# Patient Record
Sex: FEMALE | NOT HISPANIC OR LATINO | URBAN - METROPOLITAN AREA
[De-identification: names, ages, dates, MRNs, and addresses within clinical notes are randomized per-mention and may not be internally consistent; named-entity substitution may affect disease eponyms.]

---

## 2017-02-27 ENCOUNTER — EMERGENCY (EMERGENCY)
Facility: HOSPITAL | Age: 62
LOS: 1 days | Discharge: HOME | End: 2017-02-27
Attending: EMERGENCY MEDICINE
Payer: COMMERCIAL

## 2017-02-27 VITALS
DIASTOLIC BLOOD PRESSURE: 99 MMHG | SYSTOLIC BLOOD PRESSURE: 155 MMHG | OXYGEN SATURATION: 98 % | RESPIRATION RATE: 20 BRPM | WEIGHT: 151.9 LBS | HEART RATE: 69 BPM | HEIGHT: 67 IN | TEMPERATURE: 98 F

## 2017-02-27 DIAGNOSIS — M54.5 LOW BACK PAIN: ICD-10-CM

## 2017-02-27 DIAGNOSIS — Z88.8 ALLERGY STATUS TO OTHER DRUGS, MEDICAMENTS AND BIOLOGICAL SUBSTANCES STATUS: ICD-10-CM

## 2017-02-27 DIAGNOSIS — M54.30 SCIATICA, UNSPECIFIED SIDE: ICD-10-CM

## 2017-02-27 DIAGNOSIS — M50.20 OTHER CERVICAL DISC DISPLACEMENT, UNSPECIFIED CERVICAL REGION: ICD-10-CM

## 2017-02-27 PROCEDURE — 99284 EMERGENCY DEPT VISIT MOD MDM: CPT

## 2017-02-27 RX ORDER — METHOCARBAMOL 500 MG/1
750 TABLET, FILM COATED ORAL ONCE
Qty: 0 | Refills: 0 | Status: COMPLETED | OUTPATIENT
Start: 2017-02-27 | End: 2017-02-27

## 2017-02-27 RX ORDER — KETOROLAC TROMETHAMINE 30 MG/ML
30 SYRINGE (ML) INJECTION ONCE
Qty: 0 | Refills: 0 | Status: DISCONTINUED | OUTPATIENT
Start: 2017-02-27 | End: 2017-02-27

## 2017-02-27 RX ORDER — METHOCARBAMOL 500 MG/1
1 TABLET, FILM COATED ORAL
Qty: 10 | Refills: 0 | OUTPATIENT
Start: 2017-02-27 | End: 2017-03-04

## 2017-02-27 RX ADMIN — Medication 30 MILLIGRAM(S): at 20:36

## 2017-02-27 RX ADMIN — METHOCARBAMOL 750 MILLIGRAM(S): 500 TABLET, FILM COATED ORAL at 20:36

## 2017-02-27 NOTE — ED PROVIDER NOTE - MEDICAL DECISION MAKING DETAILS
Here with low back pain, sciatica  no  red flag signs or symptoms plan for rest, nsaids, muscle relaxant, outpt f/u

## 2017-02-27 NOTE — ED PROVIDER NOTE - ATTENDING CONTRIBUTION TO CARE
H&P by me: 61 year old female PMHx herniated discs c/o atraumatic low back pain x one week, radiating to legs, like prior sciatica episodes, no neurovascular complaints. PE: NAD, + bilateral paravertebral mid to distal lumbar mild tenderness, neurovascularly intact. I&P: sciatica, analgesics, rest, PMD follow up

## 2017-02-27 NOTE — ED PROVIDER NOTE - OBJECTIVE STATEMENT
61F with a history of chronic back pain, disc herniation, here with acute on chronic low back pain radiating down left lower extremity. No numbness or weakness. No bowel or bladder dysfunction. No fever, chills, nausea, vomiting. No trauma or injury. Able to ambulate without difficulty, has been taking Aleve with minimal relief.

## 2017-02-27 NOTE — ED PROVIDER NOTE - MUSCULOSKELETAL, MLM
Spine appears normal, range of motion is not limited, no muscle or joint tenderness Spine appears normal, range of motion is not limited, + bilateral paravertebral mid to distal lumbar mild tenderness

## 2017-11-02 ENCOUNTER — EMERGENCY (EMERGENCY)
Facility: HOSPITAL | Age: 62
LOS: 0 days | Discharge: HOME | End: 2017-11-02
Attending: EMERGENCY MEDICINE
Payer: COMMERCIAL

## 2017-11-02 VITALS
RESPIRATION RATE: 18 BRPM | TEMPERATURE: 98 F | DIASTOLIC BLOOD PRESSURE: 86 MMHG | WEIGHT: 154.98 LBS | HEART RATE: 72 BPM | OXYGEN SATURATION: 99 % | SYSTOLIC BLOOD PRESSURE: 129 MMHG | HEIGHT: 67 IN

## 2017-11-02 DIAGNOSIS — Z98.890 OTHER SPECIFIED POSTPROCEDURAL STATES: Chronic | ICD-10-CM

## 2017-11-02 PROCEDURE — 99284 EMERGENCY DEPT VISIT MOD MDM: CPT

## 2017-11-02 PROCEDURE — 73502 X-RAY EXAM HIP UNI 2-3 VIEWS: CPT | Mod: 26,RT

## 2017-11-02 RX ORDER — TRAMADOL HYDROCHLORIDE 50 MG/1
1 TABLET ORAL
Qty: 12 | Refills: 0 | OUTPATIENT
Start: 2017-11-02 | End: 2017-11-06

## 2017-11-02 RX ORDER — TRAMADOL HYDROCHLORIDE 50 MG/1
50 TABLET ORAL ONCE
Qty: 0 | Refills: 0 | Status: DISCONTINUED | OUTPATIENT
Start: 2017-11-02 | End: 2017-11-02

## 2017-11-02 RX ADMIN — TRAMADOL HYDROCHLORIDE 50 MILLIGRAM(S): 50 TABLET ORAL at 20:27

## 2017-11-02 NOTE — ED PROVIDER NOTE - OBJECTIVE STATEMENT
62F here with right hip pain, ongoing x 6 months, worsened over the last 2 months, has tried naproxen, orthopedics recommended injections but she has declined. Today with continued pain, no fever, chills, nausea, vomiting. No numbness or weakness. Ambulating without difficulty. No new trauma or injury. No abdominal pain.

## 2017-11-02 NOTE — ED PROVIDER NOTE - MEDICAL DECISION MAKING DETAILS
Here with acute on chronic hip pain, known arthritis, will repeat xray, add tramadol to naproxen, recommend orthopedic follow-up.

## 2017-11-02 NOTE — ED ADULT TRIAGE NOTE - CHIEF COMPLAINT QUOTE
c/o r hip area pain radiating down r leg intermittantly x 2 months denies known injury or recent trauma using aleve and naproxen prn with some improvement ambulatory without difficulty noted

## 2017-11-02 NOTE — ED PROVIDER NOTE - ATTENDING CONTRIBUTION TO CARE
62 year old female c/o atraumatic intermittent hip pain x several months; patient evaluated by orthopedist for similar complaint however patient states to decline recommended tx PE: NAD, abd NTND, lumbar spine nontender, right hip FROM, non-tender, gait normal. I&P: X-ray show degenerative changes, exacerbation of arthritis of hip, analgesics, rest, ortho follow up

## 2017-11-03 DIAGNOSIS — M25.551 PAIN IN RIGHT HIP: ICD-10-CM

## 2017-11-03 DIAGNOSIS — G89.29 OTHER CHRONIC PAIN: ICD-10-CM

## 2017-11-03 DIAGNOSIS — M79.661 PAIN IN RIGHT LOWER LEG: ICD-10-CM

## 2017-11-03 DIAGNOSIS — M13.851 OTHER SPECIFIED ARTHRITIS, RIGHT HIP: ICD-10-CM

## 2019-03-23 ENCOUNTER — EMERGENCY (EMERGENCY)
Facility: HOSPITAL | Age: 64
LOS: 0 days | Discharge: HOME | End: 2019-03-23
Attending: EMERGENCY MEDICINE
Payer: COMMERCIAL

## 2019-03-23 VITALS
SYSTOLIC BLOOD PRESSURE: 132 MMHG | TEMPERATURE: 98 F | RESPIRATION RATE: 16 BRPM | DIASTOLIC BLOOD PRESSURE: 78 MMHG | HEART RATE: 80 BPM | OXYGEN SATURATION: 99 %

## 2019-03-23 VITALS
HEART RATE: 83 BPM | OXYGEN SATURATION: 99 % | RESPIRATION RATE: 16 BRPM | SYSTOLIC BLOOD PRESSURE: 142 MMHG | WEIGHT: 158.07 LBS | HEIGHT: 67 IN | DIASTOLIC BLOOD PRESSURE: 82 MMHG | TEMPERATURE: 98 F

## 2019-03-23 DIAGNOSIS — J02.9 ACUTE PHARYNGITIS, UNSPECIFIED: ICD-10-CM

## 2019-03-23 DIAGNOSIS — Z96.641 PRESENCE OF RIGHT ARTIFICIAL HIP JOINT: Chronic | ICD-10-CM

## 2019-03-23 DIAGNOSIS — R05 COUGH: ICD-10-CM

## 2019-03-23 DIAGNOSIS — J06.9 ACUTE UPPER RESPIRATORY INFECTION, UNSPECIFIED: ICD-10-CM

## 2019-03-23 DIAGNOSIS — I10 ESSENTIAL (PRIMARY) HYPERTENSION: ICD-10-CM

## 2019-03-23 DIAGNOSIS — Z98.890 OTHER SPECIFIED POSTPROCEDURAL STATES: Chronic | ICD-10-CM

## 2019-03-23 PROCEDURE — 71046 X-RAY EXAM CHEST 2 VIEWS: CPT | Mod: 26

## 2019-03-23 PROCEDURE — 99283 EMERGENCY DEPT VISIT LOW MDM: CPT

## 2019-03-23 RX ORDER — BENZOCAINE AND MENTHOL 5; 1 G/100ML; G/100ML
1 LIQUID ORAL ONCE
Qty: 0 | Refills: 0 | Status: COMPLETED | OUTPATIENT
Start: 2019-03-23 | End: 2019-03-23

## 2019-03-23 RX ORDER — BENZOCAINE AND MENTHOL 5; 1 G/100ML; G/100ML
1 LIQUID ORAL
Qty: 24 | Refills: 0
Start: 2019-03-23 | End: 2019-03-24

## 2019-03-23 RX ADMIN — Medication 100 MILLIGRAM(S): at 09:59

## 2019-03-23 RX ADMIN — BENZOCAINE AND MENTHOL 1 LOZENGE: 5; 1 LIQUID ORAL at 09:59

## 2019-03-23 NOTE — ED PROVIDER NOTE - OBJECTIVE STATEMENT
Pt is a 62 yo lady with a pmhx of HTN who presents to the ED with sore throat and cough for 2 days. Has had no fever, but cough productive of yellow sputum. No chest pain, no sob, no body aches. Did not get flu vaccine. No sick contacts.

## 2019-03-23 NOTE — ED ADULT TRIAGE NOTE - CHIEF COMPLAINT QUOTE
Losing her voice since Thursday on and off. It is the worse now.  coughing since yesterday yellowish phlegm

## 2019-03-23 NOTE — ED ADULT NURSE NOTE - NSIMPLEMENTINTERV_GEN_ALL_ED
Implemented All Universal Safety Interventions:  Amanda to call system. Call bell, personal items and telephone within reach. Instruct patient to call for assistance. Room bathroom lighting operational. Non-slip footwear when patient is off stretcher. Physically safe environment: no spills, clutter or unnecessary equipment. Stretcher in lowest position, wheels locked, appropriate side rails in place.

## 2020-06-22 ENCOUNTER — OUTPATIENT (OUTPATIENT)
Dept: OUTPATIENT SERVICES | Facility: HOSPITAL | Age: 65
LOS: 1 days | Discharge: HOME | End: 2020-06-22

## 2020-06-22 DIAGNOSIS — Z98.890 OTHER SPECIFIED POSTPROCEDURAL STATES: Chronic | ICD-10-CM

## 2020-06-22 DIAGNOSIS — Z01.818 ENCOUNTER FOR OTHER PREPROCEDURAL EXAMINATION: ICD-10-CM

## 2020-06-23 LAB — SARS-COV-2 RNA SPEC QL NAA+PROBE: SIGNIFICANT CHANGE UP

## 2020-06-24 ENCOUNTER — TRANSCRIPTION ENCOUNTER (OUTPATIENT)
Age: 65
End: 2020-06-24

## 2020-06-25 ENCOUNTER — OUTPATIENT (OUTPATIENT)
Dept: OUTPATIENT SERVICES | Facility: HOSPITAL | Age: 65
LOS: 1 days | Discharge: HOME | End: 2020-06-25
Payer: COMMERCIAL

## 2020-06-25 ENCOUNTER — RESULT REVIEW (OUTPATIENT)
Age: 65
End: 2020-06-25

## 2020-06-25 VITALS
OXYGEN SATURATION: 100 % | HEART RATE: 62 BPM | DIASTOLIC BLOOD PRESSURE: 87 MMHG | RESPIRATION RATE: 17 BRPM | TEMPERATURE: 98 F | SYSTOLIC BLOOD PRESSURE: 157 MMHG

## 2020-06-25 VITALS
DIASTOLIC BLOOD PRESSURE: 81 MMHG | HEIGHT: 67 IN | SYSTOLIC BLOOD PRESSURE: 146 MMHG | TEMPERATURE: 98 F | OXYGEN SATURATION: 100 % | WEIGHT: 160.06 LBS | HEART RATE: 59 BPM | RESPIRATION RATE: 18 BRPM

## 2020-06-25 DIAGNOSIS — Z96.641 PRESENCE OF RIGHT ARTIFICIAL HIP JOINT: Chronic | ICD-10-CM

## 2020-06-25 DIAGNOSIS — C16.9 MALIGNANT NEOPLASM OF STOMACH, UNSPECIFIED: ICD-10-CM

## 2020-06-25 DIAGNOSIS — Z98.890 OTHER SPECIFIED POSTPROCEDURAL STATES: Chronic | ICD-10-CM

## 2020-06-25 PROCEDURE — 88312 SPECIAL STAINS GROUP 1: CPT | Mod: 26

## 2020-06-25 PROCEDURE — 88305 TISSUE EXAM BY PATHOLOGIST: CPT | Mod: 26

## 2020-06-25 RX ORDER — SODIUM CHLORIDE 9 MG/ML
1000 INJECTION, SOLUTION INTRAVENOUS
Refills: 0 | Status: DISCONTINUED | OUTPATIENT
Start: 2020-06-25 | End: 2020-07-10

## 2020-06-25 RX ORDER — AMLODIPINE BESYLATE 2.5 MG/1
1 TABLET ORAL
Qty: 0 | Refills: 0 | DISCHARGE

## 2020-06-25 RX ORDER — ASPIRIN/CALCIUM CARB/MAGNESIUM 324 MG
1 TABLET ORAL
Qty: 0 | Refills: 0 | DISCHARGE

## 2020-06-25 RX ORDER — ONDANSETRON 8 MG/1
4 TABLET, FILM COATED ORAL ONCE
Refills: 0 | Status: DISCONTINUED | OUTPATIENT
Start: 2020-06-25 | End: 2020-07-10

## 2020-06-25 NOTE — ASU PATIENT PROFILE, ADULT - PMH
Herniated disc, cervical    Rib fracture  two fractured ribs  after a fall, left side  Sciatic leg pain

## 2020-06-25 NOTE — ASU DISCHARGE PLAN (ADULT/PEDIATRIC) - ASU DC SPECIAL INSTRUCTIONSFT
No aspirin or aspirin like medications until further notice days (e.g.  Motrin, Advil, Naprosyn, Aleve, etc.) Tylenol is fine.   Otherwise, resume all previous medications

## 2020-06-25 NOTE — PROGRESS NOTE ADULT - SUBJECTIVE AND OBJECTIVE BOX
Procedure:           Upper GI endoscopy 06-25-20 @ 12:05    Indications:                     Monitored Anesthesia Care Provided by :     ____________________________________________________________________________________________________  Procedure:           Pre-Anesthesia Assessment:                       - Prior to the procedure, a History and Physical was performed, and patient                        medications and allergies were reviewed. The patient is competent. The risks                        and benefits of the procedure and the sedation options and risks were                        discussed with the patient. All questions were answered and informed consent                        was obtained. Patient identification and proposed procedure were verified by                        the physician, the nurse and the anesthesiologist in the procedure room.                        Mental Status Examination:    alert and oriented. Airway Examination: normal                        oropharyngeal airway and neck mobility. Respiratory Examination: clear to                        auscultation. CV Examination: normal. Prophylactic Antibiotics:   The patient                        does not require prophylactic antibiotics.                        Grade Assessment:  After                        reviewing the risks and benefits, the patient was deemed in satisfactory                        condition to undergo the procedure. The anesthesia plan was to use monitored                        anesthesia care (MAC). Immediately prior to administration of medications,                        the patient was re-assessed for adequacy to receive sedatives. The heart        		     rate, respiratory rate, oxygen saturations, blood pressure, adequacy of                        pulmonary ventilation, and response to care were monitored throughout the                        procedure. The physical status of the patient was re-assessed after the                        procedure.                       After obtaining informed consent, the endoscope was passed under direct                        vision. Throughout the procedure, the patient's blood pressure, pulse, and                        oxygen saturations were monitored continuously. The Endoscope was introduced                        through the mouth, and advanced to the second part of duodenum. retroflexion was done in the stomach The upper GI                        endoscopy was accomplished with ease. The patient tolerated the procedure                        well.    ESOPHAGUS:   WNL    STOMACH:  Erythema / Edema in antrum with a dimple in the center    DUODENUM: WNL      Assessment :   as above     PLAN :  check histology, stop the aspirin

## 2020-06-26 LAB — SURGICAL PATHOLOGY STUDY: SIGNIFICANT CHANGE UP

## 2020-07-01 DIAGNOSIS — R93.3 ABNORMAL FINDINGS ON DIAGNOSTIC IMAGING OF OTHER PARTS OF DIGESTIVE TRACT: ICD-10-CM

## 2020-07-01 DIAGNOSIS — I10 ESSENTIAL (PRIMARY) HYPERTENSION: ICD-10-CM

## 2020-07-01 DIAGNOSIS — Z79.82 LONG TERM (CURRENT) USE OF ASPIRIN: ICD-10-CM

## 2020-07-01 DIAGNOSIS — K29.70 GASTRITIS, UNSPECIFIED, WITHOUT BLEEDING: ICD-10-CM

## 2021-02-17 PROBLEM — S22.39XA FRACTURE OF ONE RIB, UNSPECIFIED SIDE, INITIAL ENCOUNTER FOR CLOSED FRACTURE: Chronic | Status: ACTIVE | Noted: 2020-06-25

## 2021-02-22 ENCOUNTER — OUTPATIENT (OUTPATIENT)
Dept: OUTPATIENT SERVICES | Facility: HOSPITAL | Age: 66
LOS: 1 days | Discharge: HOME | End: 2021-02-22
Payer: COMMERCIAL

## 2021-02-22 DIAGNOSIS — Z96.641 PRESENCE OF RIGHT ARTIFICIAL HIP JOINT: Chronic | ICD-10-CM

## 2021-02-22 DIAGNOSIS — Z98.890 OTHER SPECIFIED POSTPROCEDURAL STATES: Chronic | ICD-10-CM

## 2021-02-22 DIAGNOSIS — C34.90 MALIGNANT NEOPLASM OF UNSPECIFIED PART OF UNSPECIFIED BRONCHUS OR LUNG: ICD-10-CM

## 2021-02-23 ENCOUNTER — RESULT REVIEW (OUTPATIENT)
Age: 66
End: 2021-02-23

## 2021-02-23 LAB — SURGICAL PATHOLOGY STUDY: SIGNIFICANT CHANGE UP

## 2021-02-23 PROCEDURE — 88321 CONSLTJ&REPRT SLD PREP ELSWR: CPT

## 2021-03-02 ENCOUNTER — APPOINTMENT (OUTPATIENT)
Dept: HEMATOLOGY ONCOLOGY | Facility: CLINIC | Age: 66
End: 2021-03-02
Payer: COMMERCIAL

## 2021-03-02 VITALS
OXYGEN SATURATION: 100 % | HEART RATE: 87 BPM | SYSTOLIC BLOOD PRESSURE: 132 MMHG | BODY MASS INDEX: 25.21 KG/M2 | TEMPERATURE: 98.2 F | RESPIRATION RATE: 16 BRPM | DIASTOLIC BLOOD PRESSURE: 87 MMHG | WEIGHT: 158.73 LBS | HEIGHT: 66.5 IN

## 2021-03-02 DIAGNOSIS — E04.1 NONTOXIC SINGLE THYROID NODULE: ICD-10-CM

## 2021-03-02 DIAGNOSIS — R59.0 LOCALIZED ENLARGED LYMPH NODES: ICD-10-CM

## 2021-03-02 PROCEDURE — 99205 OFFICE O/P NEW HI 60 MIN: CPT

## 2021-03-02 PROCEDURE — 99072 ADDL SUPL MATRL&STAF TM PHE: CPT

## 2021-03-02 NOTE — REVIEW OF SYSTEMS
[Negative] : Allergic/Immunologic [Recent Change In Weight] : ~T no recent weight change [FreeTextEntry6] : as above

## 2021-03-02 NOTE — CONSULT LETTER
[Dear  ___] : Dear  [unfilled], [Consult Letter:] : I had the pleasure of evaluating your patient, [unfilled]. [Please see my note below.] : Please see my note below. [Consult Closing:] : Thank you very much for allowing me to participate in the care of this patient.  If you have any questions, please do not hesitate to contact me. [Sincerely,] : Sincerely, [FreeTextEntry2] : Dr. Aline Cortez [FreeTextEntry3] : Dion Espinal MD\par \par

## 2021-03-02 NOTE — ASSESSMENT
[FreeTextEntry1] : 66 yo never-smoker with recently diagnosed stage IB lung adeno\par Discussed early stage and no indication for adjuvant tx\par Even though stage IB, would recommend brain MRI for staging completion \par US neck to evaluate thyroid nodule and palpable neck adenopathy\par Obtain imaging discs and upload\par Will try to request NGS testing on tumor tissue- would not impact current recommendations of watchful waiting but  might help in future and to understand disease process\par Discussed her insomnia and stress. Provided comfort and reassurance\par SHe is uptodate with cancer screening\par Will follow up via TEB in 1 month \par Contact information provided and all questions were answered

## 2021-03-02 NOTE — HISTORY OF PRESENT ILLNESS
[Disease: _____________________] : Disease: [unfilled] [T: ___] : T[unfilled] [N: ___] : N[unfilled] [AJCC Stage: ____] : AJCC Stage: [unfilled] [de-identified] : Ms. Beckwith is a pleasant 65 year-old w with no prior medical history who fell and hurt her ribs on left side. CXR showed rib fx and an incidental left lower lobe lesion. This led to additional tests including CT C/A/P in September and a PET/CT on 10/9/20 which showed mildly FDG-avid 3.3 x 2.5 cm LLL opacity with no significant mediastinal or hilar adenopathy. Small FDG-avid notes were noted in rt neck as well as an non- FDG-avid rt thyroid nodule. Additional left sided nodules were noted, all sub-cm and not specific. FNA of this lesion was benign per patient. Follow up CT in November 2020 showed that the lesion had increased in size. She saw thoracic surgery and was recommended surgical resection. 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 She saw a medical oncologist at [facility] and was recommended no adjuvant chemo. She is here for another opinion. She has some soreness at left rib injury site and VATS site. \par Denies any cough, dyspnea, weight loss or other symptoms.  [de-identified] : mucinous adeno

## 2021-03-02 NOTE — PHYSICAL EXAM
[Fully active, able to carry on all pre-disease performance without restriction] : Status 0 - Fully active, able to carry on all pre-disease performance without restriction [Normal] : affect appropriate [de-identified] : rt thyroid nodule

## 2021-03-21 DIAGNOSIS — Z01.818 ENCOUNTER FOR OTHER PREPROCEDURAL EXAMINATION: ICD-10-CM

## 2021-03-22 ENCOUNTER — APPOINTMENT (OUTPATIENT)
Dept: DISASTER EMERGENCY | Facility: CLINIC | Age: 66
End: 2021-03-22

## 2021-03-23 LAB — SARS-COV-2 N GENE NPH QL NAA+PROBE: NOT DETECTED

## 2021-03-24 ENCOUNTER — TRANSCRIPTION ENCOUNTER (OUTPATIENT)
Age: 66
End: 2021-03-24

## 2021-03-25 ENCOUNTER — RESULT REVIEW (OUTPATIENT)
Age: 66
End: 2021-03-25

## 2021-03-25 ENCOUNTER — OUTPATIENT (OUTPATIENT)
Dept: OUTPATIENT SERVICES | Facility: HOSPITAL | Age: 66
LOS: 1 days | Discharge: HOME | End: 2021-03-25
Payer: COMMERCIAL

## 2021-03-25 VITALS
RESPIRATION RATE: 16 BRPM | SYSTOLIC BLOOD PRESSURE: 122 MMHG | TEMPERATURE: 98 F | HEART RATE: 57 BPM | OXYGEN SATURATION: 100 % | DIASTOLIC BLOOD PRESSURE: 74 MMHG

## 2021-03-25 VITALS
RESPIRATION RATE: 18 BRPM | SYSTOLIC BLOOD PRESSURE: 149 MMHG | HEART RATE: 75 BPM | WEIGHT: 158.07 LBS | OXYGEN SATURATION: 100 % | DIASTOLIC BLOOD PRESSURE: 78 MMHG | TEMPERATURE: 98 F | HEIGHT: 67 IN

## 2021-03-25 DIAGNOSIS — Z96.641 PRESENCE OF RIGHT ARTIFICIAL HIP JOINT: Chronic | ICD-10-CM

## 2021-03-25 DIAGNOSIS — Z98.890 OTHER SPECIFIED POSTPROCEDURAL STATES: Chronic | ICD-10-CM

## 2021-03-25 PROCEDURE — 88305 TISSUE EXAM BY PATHOLOGIST: CPT | Mod: 26

## 2021-03-25 RX ORDER — AMLODIPINE BESYLATE 2.5 MG/1
1 TABLET ORAL
Qty: 0 | Refills: 0 | DISCHARGE

## 2021-03-25 RX ORDER — SODIUM CHLORIDE 9 MG/ML
1000 INJECTION, SOLUTION INTRAVENOUS
Refills: 0 | Status: DISCONTINUED | OUTPATIENT
Start: 2021-03-25 | End: 2021-03-25

## 2021-03-25 RX ORDER — ASPIRIN/CALCIUM CARB/MAGNESIUM 324 MG
1 TABLET ORAL
Qty: 0 | Refills: 0 | DISCHARGE

## 2021-03-25 NOTE — ASU PATIENT PROFILE, ADULT - PMH
Herniated disc, cervical    Rib fracture  two fractured ribs  after a fall, left side  Sciatic leg pain     Herniated disc, cervical    HTN (hypertension)    Rib fracture  two fractured ribs  after a fall, left side  Sciatic leg pain

## 2021-03-25 NOTE — ASU DISCHARGE PLAN (ADULT/PEDIATRIC) - ASU DC SPECIAL INSTRUCTIONSFT
Resume all previous medications    Please call Dr. Stevenson's office at  to been seen in a follow up office visit two weeks after the completion of your procedure.

## 2021-03-25 NOTE — ASU DISCHARGE PLAN (ADULT/PEDIATRIC) - CARE PROVIDER_API CALL
[place] [address]  Phone: [phone]  Fax: [phone]  Follow Up Time: 2 weeks

## 2021-03-26 LAB — SURGICAL PATHOLOGY STUDY: SIGNIFICANT CHANGE UP

## 2021-04-03 DIAGNOSIS — K57.30 DIVERTICULOSIS OF LARGE INTESTINE WITHOUT PERFORATION OR ABSCESS WITHOUT BLEEDING: ICD-10-CM

## 2021-04-03 DIAGNOSIS — K63.89 OTHER SPECIFIED DISEASES OF INTESTINE: ICD-10-CM

## 2021-04-03 DIAGNOSIS — I10 ESSENTIAL (PRIMARY) HYPERTENSION: ICD-10-CM

## 2021-04-03 DIAGNOSIS — Z85.118 PERSONAL HISTORY OF OTHER MALIGNANT NEOPLASM OF BRONCHUS AND LUNG: ICD-10-CM

## 2021-04-03 DIAGNOSIS — Z79.82 LONG TERM (CURRENT) USE OF ASPIRIN: ICD-10-CM

## 2021-04-03 DIAGNOSIS — Z98.51 TUBAL LIGATION STATUS: ICD-10-CM

## 2021-04-03 DIAGNOSIS — Z12.11 ENCOUNTER FOR SCREENING FOR MALIGNANT NEOPLASM OF COLON: ICD-10-CM

## 2021-04-03 DIAGNOSIS — K21.9 GASTRO-ESOPHAGEAL REFLUX DISEASE WITHOUT ESOPHAGITIS: ICD-10-CM

## 2021-04-03 DIAGNOSIS — Z90.2 ACQUIRED ABSENCE OF LUNG [PART OF]: ICD-10-CM

## 2021-04-07 ENCOUNTER — OUTPATIENT (OUTPATIENT)
Dept: OUTPATIENT SERVICES | Facility: HOSPITAL | Age: 66
LOS: 1 days | Discharge: HOME | End: 2021-04-07

## 2021-04-07 DIAGNOSIS — Z96.641 PRESENCE OF RIGHT ARTIFICIAL HIP JOINT: Chronic | ICD-10-CM

## 2021-04-07 DIAGNOSIS — Z98.890 OTHER SPECIFIED POSTPROCEDURAL STATES: Chronic | ICD-10-CM

## 2021-04-07 DIAGNOSIS — C34.90 MALIGNANT NEOPLASM OF UNSPECIFIED PART OF UNSPECIFIED BRONCHUS OR LUNG: ICD-10-CM

## 2021-04-07 PROBLEM — I10 ESSENTIAL (PRIMARY) HYPERTENSION: Chronic | Status: ACTIVE | Noted: 2021-03-25

## 2021-04-09 ENCOUNTER — APPOINTMENT (OUTPATIENT)
Dept: HEMATOLOGY ONCOLOGY | Facility: CLINIC | Age: 66
End: 2021-04-09
Payer: COMMERCIAL

## 2021-04-09 DIAGNOSIS — C34.90 MALIGNANT NEOPLASM OF UNSPECIFIED PART OF UNSPECIFIED BRONCHUS OR LUNG: ICD-10-CM

## 2021-04-09 PROCEDURE — 99443: CPT

## 2021-04-09 NOTE — ASSESSMENT
[FreeTextEntry1] : 66 yo never-smoker with recently diagnosed stage IB lung adeno\par Discussed early stage and no indication for adjuvant tx\par Brain MRI showed no mets\par NGS revealed ERBB2 mut- I discussed this with pt. Would not  at this time but useful info in case there is recurrent disease. \par Thyroid US showed cystic nodules and a 1 year follow up was recommended. \par Pt will follow up with thoracic surgery and PCP\par OV/TEB with us in 1 year\par All questions were answered\par Encourage pt to take COVID vaccine

## 2021-04-09 NOTE — HISTORY OF PRESENT ILLNESS
[Disease: _____________________] : Disease: [unfilled] [T: ___] : T[unfilled] [N: ___] : N[unfilled] [AJCC Stage: ____] : AJCC Stage: [unfilled] [de-identified] : Ms. Beckwith is a pleasant 65 year-old w with no prior medical history who fell and hurt her ribs on left side. CXR showed rib fx and an incidental left lower lobe lesion. This led to additional tests including CT C/A/P in September and a PET/CT on 10/9/20 which showed mildly FDG-avid 3.3 x 2.5 cm LLL opacity with no significant mediastinal or hilar adenopathy. Small FDG-avid notes were noted in rt neck as well as an non- FDG-avid rt thyroid nodule. Additional left sided nodules were noted, all sub-cm and not specific. FNA of this lesion was benign per patient. Follow up CT in November 2020 showed that the lesion had increased in size. She saw thoracic surgery and was recommended surgical resection. 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 She saw a medical oncologist at [facility] and was recommended no adjuvant chemo. She is here for another opinion. She has some soreness at left rib injury site and VATS site. \par Denies any cough, dyspnea, weight loss or other symptoms. \par \par 4/9/21: No new complaints [de-identified] : mucinous adeno

## 2021-04-09 NOTE — REVIEW OF SYSTEMS
[Recent Change In Weight] : ~T no recent weight change [Negative] : Allergic/Immunologic [FreeTextEntry6] : as above

## 2021-10-18 NOTE — ED PROVIDER NOTE - SKIN, MLM
Caller: Brittani Lambert    Relationship: Self    Medication requested (name and dosage):   oxyCODONE-acetaminophen (Percocet) 7.5-325 MG per tablet    Pharmacy where request should be sent: [facility]    Additional details provided by patient: PATIENT OUT OF MEDICATION    Best call back number: [phone]    Does the patient have less than a 3 day supply:  [x] Yes  [] No    Janice Cueto   10/18/21 08:44 EDT       
Due for 6 month follow up, please schedule.   
Pt is aware  
Rx Refill Note  Requested Prescriptions     Pending Prescriptions Disp Refills   • oxyCODONE-acetaminophen (Percocet) 7.5-325 MG per tablet 60 tablet 0     Sig: Take 1 tablet by mouth Every 6 (Six) Hours As Needed for Moderate Pain .      Last office visit with prescribing clinician: 4/28/2021      Next office visit with prescribing clinician: Visit date not found            Loan Issa MA  10/18/21, 09:28 EDT  
Skin normal color for race, warm, dry and intact. No evidence of rash.

## 2022-10-07 NOTE — ED ADULT NURSE NOTE - NS ED NURSE DC PT EDUCATION RESOURCES
Anesthesia Evaluation     Patient summary reviewed and Nursing notes reviewed   history of anesthetic complications: PONV  NPO Solid Status: > 8 hours  NPO Liquid Status: > 8 hours           Airway   Mallampati: II  TM distance: >3 FB  Neck ROM: full  No difficulty expected  Dental - normal exam     Pulmonary    (+) sleep apnea (machine on recall),   (-) rhonchi, decreased breath sounds, wheezes, not a smoker  Cardiovascular   Exercise tolerance: good (4-7 METS)    PT is on anticoagulation therapy  Rhythm: regular  Rate: normal    (+) hypertension, CAD, cardiac stents (pt states had heart stent 6 months ago off effient x 7 days now ) Drug eluting stent within the past 12 months   (-) angina, ESTRADA, murmur      Neuro/Psych  (+) dizziness/light headedness,    (-) CVA  GI/Hepatic/Renal/Endo    (+)  GERD,  diabetes mellitus type 2 well controlled,   (-) no renal disease    Musculoskeletal     Abdominal     Abdomen: soft.   Substance History      OB/GYN          Other   arthritis,                    Anesthesia Plan    ASA 3     general with block     (Left adductor canal   Subtherapeutic propofol infusion for PONV prevention )  intravenous induction     Anesthetic plan, risks, benefits, and alternatives have been provided, discussed and informed consent has been obtained with: patient.        CODE STATUS:       
Yes

## 2023-05-31 ENCOUNTER — APPOINTMENT (OUTPATIENT)
Dept: ORTHOPEDIC SURGERY | Facility: CLINIC | Age: 68
End: 2023-05-31
Payer: MEDICARE

## 2023-05-31 VITALS — BODY MASS INDEX: 25.09 KG/M2 | WEIGHT: 158 LBS | HEIGHT: 66.5 IN

## 2023-05-31 DIAGNOSIS — M54.12 RADICULOPATHY, CERVICAL REGION: ICD-10-CM

## 2023-05-31 DIAGNOSIS — M43.16 SPONDYLOLISTHESIS, LUMBAR REGION: ICD-10-CM

## 2023-05-31 DIAGNOSIS — M54.50 LOW BACK PAIN, UNSPECIFIED: ICD-10-CM

## 2023-05-31 DIAGNOSIS — M50.30 OTHER CERVICAL DISC DEGENERATION, UNSPECIFIED CERVICAL REGION: ICD-10-CM

## 2023-05-31 DIAGNOSIS — M54.2 CERVICALGIA: ICD-10-CM

## 2023-05-31 PROCEDURE — 99205 OFFICE O/P NEW HI 60 MIN: CPT

## 2023-05-31 PROCEDURE — 72050 X-RAY EXAM NECK SPINE 4/5VWS: CPT

## 2023-05-31 PROCEDURE — 72110 X-RAY EXAM L-2 SPINE 4/>VWS: CPT

## 2023-05-31 RX ORDER — CELECOXIB 100 MG/1
100 CAPSULE ORAL TWICE DAILY
Qty: 30 | Refills: 0 | Status: ACTIVE | COMMUNITY
Start: 2023-05-31 | End: 1900-01-01

## 2023-05-31 NOTE — HISTORY OF PRESENT ILLNESS
[Neck] : neck [Lower back] : lower back [10] : 10 [Dull/Aching] : dull/aching [Tingling] : tingling [Sleep] : sleep [Nothing helps with pain getting better] : Nothing helps with pain getting better [de-identified] : 1/2/17 Lumbar MRI\par Ind. review- \par Gr 1 L4/5 with b/l LR and NF stenosis;\par L5/S1 bulge abutting ventral thecal sac and B/L NF narrowing\par -----------------------------------------------------\par \par 05/31/23-RHDF. \par ZORAIDA JETT is a 67 year old female presenting with neck and low back pain that started years ago. Attended PT for 10+ years. Still has numbness/tingling in b/l feet. Has tingling/numbness in b/l hands; worse in left arm/hand. Pain in neck radiate sto LT UE to fingers. N/T in LT UE. RT UE no pain. N/T that wakes he up at night. Back pain radiates to down LT b/l LE L>R posterolaterally to toes. Has been taken gabapentin with no relief. Taking meloxicam currently.  [] : no [FreeTextEntry6] : numbness [FreeTextEntry7] : b/l arms; b/l legs

## 2023-05-31 NOTE — ASSESSMENT
[FreeTextEntry1] : PT \par Meds\par Discussed pain management \par Update CS and LS MRI: Patient has failed 6 weeks of conservative care, including physical therapy and medications. Will obtain LS and CS MRI to rule out HNP; will also be used to guide potential future injections/surgical management. \par f/up after MRI\par \par \par Gabapentin- Patient advised of sedating effects, instructed not to drive, operate machinery, or take with other sedating medications. Advised of need to taper on/off medication and risk of abruptly stopping gabapentin. \par \par

## 2023-05-31 NOTE — IMAGING
[de-identified] : CSPINE\par Inspection: No rash or ecchymosis\par Palpation: No spasm in traps, rhomboids, paracervicals\par ROM: Full with stiffness. Positive Spurling \par Strength: 5/5 bilateral deltoid, biceps, triceps, wrist flexors, wrist extensors, , abductors\par Sensation: Sensation present to light touch bilateral C5-T1 distributions\par Reflexes: Negative Schwartz's bilaterally\par Negative carpal compression test\par \par Bilateral Shoulders-\par Palpation: No tenderness to palpation\par ROM: Full with no pain\par Strength: Decent strength with rotator cuff testing\par Provocative maneuvers: Negative impingement testing \par \par LSPINE\par Inspection: No rash or ecchymosis\par Palpation: No tenderness to palpation or spasm in bilateral thoracic and lumbar paraspinal musculature, LT SI joint tenderness to palpation\par ROM: Full with no pain\par Strength: 5/5 bilateral hip flexors, knee extensors, ankle dorsiflexors, EHL, ankle plantarflexors\par Sensation: Sensation present to light touch bilateral L2-S1 distributions\par Provocative maneuvers: Positive LT straight leg raise \par \par Bilateral hips-\par Palpation: No tenderness to palpation over greater trochanter or IT band\par ROM: No pain with flexion and internal rotation  [Disc space narrowing] : Disc space narrowing [Spondylolithesis] : Spondylolithesis [FreeTextEntry1] : DDD

## 2023-06-23 ENCOUNTER — APPOINTMENT (OUTPATIENT)
Dept: ORTHOPEDIC SURGERY | Facility: CLINIC | Age: 68
End: 2023-06-23
Payer: MEDICARE

## 2023-06-23 VITALS — BODY MASS INDEX: 25.39 KG/M2 | HEIGHT: 66 IN | WEIGHT: 158 LBS

## 2023-06-23 DIAGNOSIS — M51.36 OTHER INTERVERTEBRAL DISC DEGENERATION, LUMBAR REGION: ICD-10-CM

## 2023-06-23 DIAGNOSIS — M54.16 RADICULOPATHY, LUMBAR REGION: ICD-10-CM

## 2023-06-23 PROCEDURE — 99215 OFFICE O/P EST HI 40 MIN: CPT

## 2023-06-23 NOTE — HISTORY OF PRESENT ILLNESS
[Neck] : neck [Lower back] : lower back [10] : 10 [9] : 9 [Dull/Aching] : dull/aching [Sharp] : sharp [Tingling] : tingling [Sleep] : sleep [Nothing helps with pain getting better] : Nothing helps with pain getting better [de-identified] : 1/2/17 Lumbar MRI\par Ind. review- \par Gr 1 L4/5 with b/l LR and NF stenosis;\par L5/S1 bulge abutting ventral thecal sac and B/L NF narrowing\par \par 6/16/23 [facility] Cervical MRI  - report noted in chart. \par C2-C3: Minimal disc bulge, uncovertebral hypertrophy. No spinal canal or neural foraminal stenosis.\par \par C3-C4: Disc bulge, slightly eccentric to the right with minimal uncovertebral hypertrophy. No significant spinal canal or neural foraminal stenosis.\par \par C4-C5: No disc herniation, spinal canal or neural foraminal stenosis. Minimal disc bulge.\par \par C5-C6: Disc bulge, eccentric to the right descending into the right foramina causing mild right neural foraminal narrowing. No significant spinal canal or left neural foraminal stenosis.\par \par C6-C7: Minimal disc bulge is abutting and subtle ventral thecal sac flattening. No spinal canal or neural foraminal stenosis.\par \par C7-T1: No disc herniation, spinal canal or neural foraminal stenosis.\par Ind. review- \par R>L NF narrowing C5/6\par --------------------------------\par 6/16/23 [facility] Lumbar MRI  - report noted in chart. \par T12-L1: No spinal canal or neural foraminal stenosis. No disc herniation.\par \par L1-L2: No disc herniation, spinal canal or neural foraminal stenosis.\par \par L2-L3: Disc bulge slightly extending into bilateral foramina and lateral recess, left greater than right without significant spinal canal or neural foraminal stenosis.\par \par L3-L4: Disc bulge, extending into bilateral foramina and lateral recesses, left greater than right, mild facet joint arthrosis and ligamentum flavum thickening. No significant spinal canal or neural foraminal narrowing.\par \par L4-L5: Stable anterolisthesis, unroofing of the disc with superimposed broad disc bulge, with annular fissure slightly eccentric to the left, severe bilateral facet joint arthrosis and ligamentum flavum thickening overall resulting and moderate spinal canal stenosis, moderate to severe left and mild right neural foraminal stenosis, abutting the exiting L4 nerves, unchanged from prior study.\par \par L5-S1: Disc bulge, eccentric to the right with shallow central disc protrusion, impinging the descending bilateral S1 nerves. No significant spinal canal or neural foraminal narrowing. The disc bulge is abutting the exiting right L5 nerve in the extraforaminal zone. Findings overall not significantly changed from prior study.\par Ind. review- \par Gr 1 L4/5 with central stenosis, B/L LR and NF stenosis L>R\par -----------------------------------------------------\par 05/31/23-RHDF. \par ZORAIDA JETT is a 67 year old female presenting with neck and low back pain that started years ago. Attended PT for 10+ years. Still has numbness/tingling in b/l feet. Has tingling/numbness in b/l hands; worse in left arm/hand. Pain in neck radiates to LT UE to fingers. N/T in LT UE. RT UE no pain. N/T that wakes he up at night. Back pain radiates to down LT b/l LE L>R posterolaterally to toes. Has been taken gabapentin with no relief. Taking meloxicam currently. \par \par 6/23/23- MRI f/u, Neck pain better after receiving CSI for plantar fasciitis. Still severe LLE>RLE radicular pain [] : no [FreeTextEntry6] : numbness [FreeTextEntry7] : b/l arms; b/l legs

## 2023-06-23 NOTE — REASON FOR VISIT
[FreeTextEntry2] : 06/23/2023 :ZORAIDAODALIS JETT , a 68 year old female, presents today for C & L Spine mri reults

## 2023-06-23 NOTE — IMAGING
[Disc space narrowing] : Disc space narrowing [Spondylolithesis] : Spondylolithesis [de-identified] : CSPINE\par Inspection: No rash or ecchymosis\par Palpation: No spasm in traps, rhomboids, paracervicals\par ROM: Full with stiffness. Positive Spurling \par Strength: 5/5 bilateral deltoid, biceps, triceps, wrist flexors, wrist extensors, , abductors\par Sensation: Sensation present to light touch bilateral C5-T1 distributions\par Reflexes: Negative Schwartz's bilaterally\par Negative carpal compression test\par \par Bilateral Shoulders-\par Palpation: No tenderness to palpation\par ROM: Full with no pain\par Strength: Decent strength with rotator cuff testing\par Provocative maneuvers: Negative impingement testing \par \par LSPINE\par Palpation: No tenderness to palpation or spasm in bilateral thoracic and lumbar paraspinal musculature, LT SI joint tenderness to palpation\par ROM: Full with stiffness\par Strength: 5/5 bilateral hip flexors, knee extensors, ankle dorsiflexors, EHL, ankle plantarflexors\par Sensation: Sensation present to light touch bilateral L2-S1 distributions\par Provocative maneuvers: Positive LT straight leg raise \par \par Bilateral hips-\par Palpation: No tenderness to palpation over greater trochanter or IT band\par ROM: No pain with flexion and internal rotation  [FreeTextEntry1] : DDD

## 2023-08-25 ENCOUNTER — APPOINTMENT (OUTPATIENT)
Dept: ORTHOPEDIC SURGERY | Facility: CLINIC | Age: 68
End: 2023-08-25

## 2024-01-24 ENCOUNTER — APPOINTMENT (OUTPATIENT)
Dept: HEPATOLOGY | Facility: CLINIC | Age: 69
End: 2024-01-24
Payer: MEDICARE

## 2024-01-24 VITALS
WEIGHT: 160 LBS | RESPIRATION RATE: 16 BRPM | BODY MASS INDEX: 25.71 KG/M2 | HEART RATE: 68 BPM | OXYGEN SATURATION: 98 % | SYSTOLIC BLOOD PRESSURE: 135 MMHG | DIASTOLIC BLOOD PRESSURE: 80 MMHG | HEIGHT: 66 IN | TEMPERATURE: 98.2 F

## 2024-01-24 DIAGNOSIS — M62.838 OTHER MUSCLE SPASM: ICD-10-CM

## 2024-01-24 DIAGNOSIS — G62.9 POLYNEUROPATHY, UNSPECIFIED: ICD-10-CM

## 2024-01-24 DIAGNOSIS — E55.9 VITAMIN D DEFICIENCY, UNSPECIFIED: ICD-10-CM

## 2024-01-24 DIAGNOSIS — I10 ESSENTIAL (PRIMARY) HYPERTENSION: ICD-10-CM

## 2024-01-24 PROCEDURE — 99204 OFFICE O/P NEW MOD 45 MIN: CPT

## 2024-01-24 RX ORDER — AMLODIPINE BESYLATE 2.5 MG/1
2.5 TABLET ORAL
Refills: 0 | Status: ACTIVE | COMMUNITY

## 2024-01-24 RX ORDER — ADHESIVE TAPE 3"X 2.3 YD
50 MCG TAPE, NON-MEDICATED TOPICAL
Refills: 0 | Status: ACTIVE | COMMUNITY

## 2024-01-24 RX ORDER — ASPIRIN 81 MG
81 TABLET, DELAYED RELEASE (ENTERIC COATED) ORAL
Refills: 0 | Status: ACTIVE | COMMUNITY

## 2024-01-27 NOTE — HISTORY OF PRESENT ILLNESS
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   She is here for initial evaluation. No prior records at the time of the initial visit.

## 2024-01-27 NOTE — REASON FOR VISIT
Patient presents to ER with vomiting , shaking and cellulitis of the lower legs.   States this has happened previously while admitted.   Denies any sob or cp  +fever +vomiting   States he was DC from [facility] on the 25th.   [Initial Evaluation] : an initial evaluation [FreeTextEntry1] : liver lesion

## 2024-01-27 NOTE — REVIEW OF SYSTEMS
[Fever] : no fever [Chills] : no chills [Chest Pain] : no chest pain [Palpitations] : no palpitations [Shortness Of Breath] : no shortness of breath [SOB on Exertion] : no shortness of breath during exertion [Dysuria] : no dysuria [Arthralgias] : no arthralgias [Itching] : no itching [Confused] : no confusion [Negative] : Heme/Lymph [FreeTextEntry6] : Occasional dry cough

## 2024-01-27 NOTE — ASSESSMENT
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   Metabolic risk factor - HLD - Pranav heck LFTs and get Fibroscan to assess for steatosis and fibrosis and will get US abd as below  Liver lesion ? - Patient will call [facility] to request prior imaging (CT/MR) and send the report and the CD to use for review - Will start w/ US abd for now, but given Hx of liver lesion, likely will need MRI abd w/wo as well - Will get tumor markers  Addendum: After the visit, obtained a CT a/p from 9/2020, that showed no liver lesion per report RTC w/ above results

## 2024-01-27 NOTE — PHYSICAL EXAM
[Non-Tender] : non-tender [General Appearance - Alert] : alert [General Appearance - In No Acute Distress] : in no acute distress [General Appearance - Well Nourished] : well nourished [General Appearance - Well Developed] : well developed [Sclera] : the sclera and conjunctiva were normal [Oropharynx] : the oropharynx was normal [Neck Appearance] : the appearance of the neck was normal [Respiration, Rhythm And Depth] : normal respiratory rhythm and effort [Exaggerated Use Of Accessory Muscles For Inspiration] : no accessory muscle use [Auscultation Breath Sounds / Voice Sounds] : lungs were clear to auscultation bilaterally [Heart Rate And Rhythm] : heart rate was normal and rhythm regular [Heart Sounds] : normal S1 and S2 [Edema] : there was no peripheral edema [Bowel Sounds] : normal bowel sounds [Abdomen Soft] : soft [Abdomen Tenderness] : non-tender [] : no hepato-splenomegaly [Abdomen Mass (___ Cm)] : no abdominal mass palpated [Abdomen Hernia] : no hernia was discovered [Cervical Lymph Nodes Enlarged Anterior Bilaterally] : anterior cervical [Cervical Lymph Nodes Enlarged Posterior Bilaterally] : posterior cervical [Supraclavicular Lymph Nodes Enlarged Bilaterally] : supraclavicular [Axillary Lymph Nodes Enlarged Bilaterally] : axillary [Femoral Lymph Nodes Enlarged Bilaterally] : femoral [Inguinal Lymph Nodes Enlarged Bilaterally] : inguinal [No CVA Tenderness] : no ~M costovertebral angle tenderness [No Spinal Tenderness] : no spinal tenderness [Abnormal Walk] : normal gait [Skin Color & Pigmentation] : normal skin color and pigmentation [Oriented To Time, Place, And Person] : oriented to person, place, and time [Impaired Insight] : insight and judgment were intact [Affect] : the affect was normal [Scleral Icterus] : No Scleral Icterus [Spider Angioma] : No spider angioma(s) were observed [Abdominal  Ascites] : no ascites [Asterixis] : no asterixis observed [Jaundice] : No jaundice [Palmar Erythema] : no Palmar Erythema [FreeTextEntry1] : Grossly intact

## 2024-01-31 ENCOUNTER — APPOINTMENT (OUTPATIENT)
Dept: ULTRASOUND IMAGING | Facility: CLINIC | Age: 69
End: 2024-01-31
Payer: MEDICARE

## 2024-01-31 PROCEDURE — 76700 US EXAM ABDOM COMPLETE: CPT

## 2024-02-06 ENCOUNTER — APPOINTMENT (OUTPATIENT)
Dept: HEPATOLOGY | Facility: CLINIC | Age: 69
End: 2024-02-06
Payer: MEDICARE

## 2024-02-06 PROCEDURE — 76981 USE PARENCHYMA: CPT

## 2024-02-28 ENCOUNTER — APPOINTMENT (OUTPATIENT)
Dept: HEPATOLOGY | Facility: CLINIC | Age: 69
End: 2024-02-28
Payer: MEDICARE

## 2024-02-28 DIAGNOSIS — R76.8 OTHER SPECIFIED ABNORMAL IMMUNOLOGICAL FINDINGS IN SERUM: ICD-10-CM

## 2024-02-28 DIAGNOSIS — R73.03 PREDIABETES.: ICD-10-CM

## 2024-02-28 LAB
AFP-TM SERPL-MCNC: 5 NG/ML
ALBUMIN SERPL ELPH-MCNC: 4.7 G/DL
ALP BLD-CCNC: 94 U/L
ALT SERPL-CCNC: 5 U/L
ANION GAP SERPL CALC-SCNC: 12 MMOL/L
APTT BLD: 32.1 SEC
AST SERPL-CCNC: 21 U/L
BILIRUB DIRECT SERPL-MCNC: 0.1 MG/DL
BILIRUB INDIRECT SERPL-MCNC: 0.2 MG/DL
BILIRUB SERPL-MCNC: 0.3 MG/DL
BUN SERPL-MCNC: 16 MG/DL
CALCIUM SERPL-MCNC: 9.6 MG/DL
CANCER AG19-9 SERPL-ACNC: 7 U/ML
CEA SERPL-MCNC: 1.7 NG/ML
CHLORIDE SERPL-SCNC: 103 MMOL/L
CO2 SERPL-SCNC: 26 MMOL/L
CREAT SERPL-MCNC: 0.9 MG/DL
EGFR: 70 ML/MIN/1.73M2
ESTIMATED AVERAGE GLUCOSE: 117 MG/DL
FERRITIN SERPL-MCNC: 43 NG/ML
FOLATE SERPL-MCNC: 16.1 NG/ML
GLUCOSE SERPL-MCNC: 83 MG/DL
HBA1C MFR BLD HPLC: 5.7 %
HBV CORE IGG+IGM SER QL: REACTIVE
HBV SURFACE AB SER QL: REACTIVE
HBV SURFACE AG SER QL: NONREACTIVE
HCV AB SER QL: NONREACTIVE
HCV S/CO RATIO: 0.22 S/CO
HEPATITIS A IGG ANTIBODY: REACTIVE
INR PPP: 0.93 RATIO
IRON SATN MFR SERPL: 35 %
IRON SERPL-MCNC: 118 UG/DL
MAGNESIUM SERPL-MCNC: 2.3 MG/DL
POTASSIUM SERPL-SCNC: 4.4 MMOL/L
PROT SERPL-MCNC: 6.8 G/DL
PT BLD: 10.5 SEC
SODIUM SERPL-SCNC: 141 MMOL/L
TIBC SERPL-MCNC: 339 UG/DL
TSH SERPL-ACNC: 1.25 UIU/ML
UIBC SERPL-MCNC: 221 UG/DL
VIT B12 SERPL-MCNC: 651 PG/ML

## 2024-02-28 PROCEDURE — 99214 OFFICE O/P EST MOD 30 MIN: CPT

## 2024-02-28 NOTE — HISTORY OF PRESENT ILLNESS
[Needlestick Exposure] : needlestick exposure [Occupational Exposure] : occupational exposure [IV Drug Use] : no IV drug use [Tattoo] : no tattoos [Hemodialysis] : no hemodialysis [Transfusion before 1992] : no transfusion before 1992 [Transplant before 1992] : no transplant before 1992 [Household Contact to HBV] : no household contact to HBV [Cocaine Use] : no cocaine use [de-identified] : Needlestick injury in 1990s. She is from [place].  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   She was seen for initial evaluation 1/24/24. No prior records at the time of the initial visit.  Labs 1/24/24 showed normal liver enzymes and function. Noted past Hep B infection and Hep A immunity. Noted prediabetes. Tumor markers (AFP, CEA, CA 19-9) were normal.  US abd 1/31/24 showed normal liver, no focal hepatic lesion.  Fibroscan 2/7/24 showed normal liver stiffness (4.3 kPa), and no steatosis ().  She is here for follow up to review results. No new complaints, but c/o numbness in fingers, mostly L 5th finger that has been noted chronically and has been seen by neurology.

## 2024-02-28 NOTE — PHYSICAL EXAM
[Non-Tender] : non-tender [General Appearance - Alert] : alert [General Appearance - In No Acute Distress] : in no acute distress [General Appearance - Well Nourished] : well nourished [Sclera] : the sclera and conjunctiva were normal [General Appearance - Well Developed] : well developed [Oropharynx] : the oropharynx was normal [Neck Appearance] : the appearance of the neck was normal [Respiration, Rhythm And Depth] : normal respiratory rhythm and effort [Exaggerated Use Of Accessory Muscles For Inspiration] : no accessory muscle use [Auscultation Breath Sounds / Voice Sounds] : lungs were clear to auscultation bilaterally [Heart Rate And Rhythm] : heart rate was normal and rhythm regular [Heart Sounds] : normal S1 and S2 [Edema] : there was no peripheral edema [Bowel Sounds] : normal bowel sounds [Abdomen Soft] : soft [Abdomen Tenderness] : non-tender [Abdomen Mass (___ Cm)] : no abdominal mass palpated [] : no hepato-splenomegaly [Abdomen Hernia] : no hernia was discovered [Cervical Lymph Nodes Enlarged Posterior Bilaterally] : posterior cervical [Cervical Lymph Nodes Enlarged Anterior Bilaterally] : anterior cervical [Supraclavicular Lymph Nodes Enlarged Bilaterally] : supraclavicular [Axillary Lymph Nodes Enlarged Bilaterally] : axillary [Femoral Lymph Nodes Enlarged Bilaterally] : femoral [Inguinal Lymph Nodes Enlarged Bilaterally] : inguinal [No CVA Tenderness] : no ~M costovertebral angle tenderness [No Spinal Tenderness] : no spinal tenderness [Skin Color & Pigmentation] : normal skin color and pigmentation [Abnormal Walk] : normal gait [Oriented To Time, Place, And Person] : oriented to person, place, and time [Impaired Insight] : insight and judgment were intact [Affect] : the affect was normal [Scleral Icterus] : No Scleral Icterus [Spider Angioma] : No spider angioma(s) were observed [Asterixis] : no asterixis observed [Abdominal  Ascites] : no ascites [Jaundice] : No jaundice [Palmar Erythema] : no Palmar Erythema [FreeTextEntry1] : Grossly intact

## 2024-02-28 NOTE — ASSESSMENT
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 However, no prior records, and the CT report obtained from 9/2020 has not shown any liver lesion, liver was reported unremarkable. Patient had normal US abd, Fibroscan and LFTs. 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   Metabolic risk factor - HLD, prediabetes - No evidence of fatty liver at present, but discussed the risk factors and advised on diet, exercise as tolerated.  Liver lesion ? - US abd and obtained past CT abd showed NO liver lesion. - Tumor markers (CEA, CA 19-9, AFP) are normal - Patient still to call [facility] to request prior imaging (CT/MR) and send the report and the CD to use for review  Past Hep B infection - Will repeat to confirm  - Discussed that under certain circumstances, immunosuppression, might need prophylactic antiviral treatment in the future.   RTC PRN (after she gets prior CT or MR report showing liver lesion)

## 2024-02-28 NOTE — REVIEW OF SYSTEMS
[Negative] : Heme/Lymph [Fever] : no fever [Chest Pain] : no chest pain [Chills] : no chills [Palpitations] : no palpitations [Shortness Of Breath] : no shortness of breath [SOB on Exertion] : no shortness of breath during exertion [Dysuria] : no dysuria [Arthralgias] : no arthralgias [Itching] : no itching [As Noted in HPI] : as noted in HPI [Confused] : no confusion [FreeTextEntry6] : Occasional dry cough [de-identified] : L 5th finger numbness, chronic

## 2024-03-02 LAB
HBV CORE IGG+IGM SER QL: REACTIVE
HBV E AB SER QL: REACTIVE
HBV E AG SER QL: NONREACTIVE
HBV SURFACE AB SER QL: REACTIVE
HBV SURFACE AG SER QL: NONREACTIVE
HEPB DNA PCR INT: NOT DETECTED
HEPB DNA PCR LOG: NOT DETECTED LOGIU/ML

## 2024-03-11 DIAGNOSIS — K76.9 LIVER DISEASE, UNSPECIFIED: ICD-10-CM

## 2024-05-23 NOTE — ASSESSMENT
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 Advised of need to taper on/off medication and risk of abruptly stopping gabapentin. \par \par

## 2024-07-03 ENCOUNTER — APPOINTMENT (OUTPATIENT)
Dept: ORTHOPEDIC SURGERY | Facility: CLINIC | Age: 69
End: 2024-07-03
Payer: MEDICARE

## 2024-07-03 DIAGNOSIS — M79.641 PAIN IN RIGHT HAND: ICD-10-CM

## 2024-07-03 DIAGNOSIS — M19.041 PRIMARY OSTEOARTHRITIS, RIGHT HAND: ICD-10-CM

## 2024-07-03 PROCEDURE — 99203 OFFICE O/P NEW LOW 30 MIN: CPT

## 2024-07-03 PROCEDURE — 73130 X-RAY EXAM OF HAND: CPT | Mod: RT

## 2024-07-03 RX ORDER — CELECOXIB 200 MG/1
200 CAPSULE ORAL
Qty: 30 | Refills: 1 | Status: ACTIVE | COMMUNITY
Start: 2024-07-03 | End: 1900-01-01

## 2024-11-04 ENCOUNTER — RX RENEWAL (OUTPATIENT)
Age: 69
End: 2024-11-04

## 2025-09-05 ENCOUNTER — APPOINTMENT (OUTPATIENT)
Dept: RHEUMATOLOGY | Facility: CLINIC | Age: 70
End: 2025-09-05